# Patient Record
Sex: FEMALE | Race: WHITE | NOT HISPANIC OR LATINO | URBAN - METROPOLITAN AREA
[De-identification: names, ages, dates, MRNs, and addresses within clinical notes are randomized per-mention and may not be internally consistent; named-entity substitution may affect disease eponyms.]

---

## 2019-01-14 ENCOUNTER — EMERGENCY (EMERGENCY)
Facility: HOSPITAL | Age: 79
LOS: 0 days | Discharge: HOME | End: 2019-01-14
Attending: EMERGENCY MEDICINE | Admitting: EMERGENCY MEDICINE

## 2019-01-14 VITALS
HEART RATE: 82 BPM | TEMPERATURE: 98 F | DIASTOLIC BLOOD PRESSURE: 65 MMHG | SYSTOLIC BLOOD PRESSURE: 145 MMHG | RESPIRATION RATE: 20 BRPM | OXYGEN SATURATION: 99 %

## 2019-01-14 DIAGNOSIS — I10 ESSENTIAL (PRIMARY) HYPERTENSION: ICD-10-CM

## 2019-01-14 DIAGNOSIS — M25.50 PAIN IN UNSPECIFIED JOINT: ICD-10-CM

## 2019-01-14 DIAGNOSIS — E78.5 HYPERLIPIDEMIA, UNSPECIFIED: ICD-10-CM

## 2019-01-14 DIAGNOSIS — R53.1 WEAKNESS: ICD-10-CM

## 2019-01-14 DIAGNOSIS — K21.9 GASTRO-ESOPHAGEAL REFLUX DISEASE WITHOUT ESOPHAGITIS: ICD-10-CM

## 2019-01-14 LAB
ANION GAP SERPL CALC-SCNC: 14 MMOL/L — SIGNIFICANT CHANGE UP (ref 7–14)
BASOPHILS # BLD AUTO: 0.04 K/UL — SIGNIFICANT CHANGE UP (ref 0–0.2)
BASOPHILS NFR BLD AUTO: 0.4 % — SIGNIFICANT CHANGE UP (ref 0–1)
BUN SERPL-MCNC: 15 MG/DL — SIGNIFICANT CHANGE UP (ref 10–20)
CALCIUM SERPL-MCNC: 9.4 MG/DL — SIGNIFICANT CHANGE UP (ref 8.5–10.1)
CHLORIDE SERPL-SCNC: 96 MMOL/L — LOW (ref 98–110)
CK SERPL-CCNC: 24 U/L — SIGNIFICANT CHANGE UP (ref 0–225)
CO2 SERPL-SCNC: 27 MMOL/L — SIGNIFICANT CHANGE UP (ref 17–32)
CREAT SERPL-MCNC: 0.7 MG/DL — SIGNIFICANT CHANGE UP (ref 0.7–1.5)
EOSINOPHIL # BLD AUTO: 0.07 K/UL — SIGNIFICANT CHANGE UP (ref 0–0.7)
EOSINOPHIL NFR BLD AUTO: 0.8 % — SIGNIFICANT CHANGE UP (ref 0–8)
GLUCOSE SERPL-MCNC: 98 MG/DL — SIGNIFICANT CHANGE UP (ref 70–99)
HCT VFR BLD CALC: 34.6 % — LOW (ref 37–47)
HGB BLD-MCNC: 10.4 G/DL — LOW (ref 12–16)
IMM GRANULOCYTES NFR BLD AUTO: 0.8 % — HIGH (ref 0.1–0.3)
LYMPHOCYTES # BLD AUTO: 1.86 K/UL — SIGNIFICANT CHANGE UP (ref 1.2–3.4)
LYMPHOCYTES # BLD AUTO: 20.5 % — SIGNIFICANT CHANGE UP (ref 20.5–51.1)
MCHC RBC-ENTMCNC: 17.2 PG — LOW (ref 27–31)
MCHC RBC-ENTMCNC: 30.1 G/DL — LOW (ref 32–37)
MCV RBC AUTO: 57.3 FL — LOW (ref 81–99)
MONOCYTES # BLD AUTO: 0.8 K/UL — HIGH (ref 0.1–0.6)
MONOCYTES NFR BLD AUTO: 8.8 % — SIGNIFICANT CHANGE UP (ref 1.7–9.3)
NEUTROPHILS # BLD AUTO: 6.23 K/UL — SIGNIFICANT CHANGE UP (ref 1.4–6.5)
NEUTROPHILS NFR BLD AUTO: 68.7 % — SIGNIFICANT CHANGE UP (ref 42.2–75.2)
NRBC # BLD: 0 /100 WBCS — SIGNIFICANT CHANGE UP (ref 0–0)
PLATELET # BLD AUTO: 281 K/UL — SIGNIFICANT CHANGE UP (ref 130–400)
POTASSIUM SERPL-MCNC: 4.7 MMOL/L — SIGNIFICANT CHANGE UP (ref 3.5–5)
POTASSIUM SERPL-SCNC: 4.7 MMOL/L — SIGNIFICANT CHANGE UP (ref 3.5–5)
RBC # BLD: 6.04 M/UL — HIGH (ref 4.2–5.4)
RBC # FLD: 20.3 % — HIGH (ref 11.5–14.5)
SODIUM SERPL-SCNC: 137 MMOL/L — SIGNIFICANT CHANGE UP (ref 135–146)
WBC # BLD: 9.07 K/UL — SIGNIFICANT CHANGE UP (ref 4.8–10.8)
WBC # FLD AUTO: 9.07 K/UL — SIGNIFICANT CHANGE UP (ref 4.8–10.8)

## 2019-01-14 NOTE — ED PROVIDER NOTE - CARE PROVIDER_API CALL
Jos Escalera), Gastroenterology; Internal Medicine  [address] [place] [address]  Phone: [phone]  Fax: [phone]    Michela Amezquita), Gastroenterology  [address] [place] [id number]  Phone: [phone]  Fax: [phone]

## 2019-01-14 NOTE — ED PROVIDER NOTE - MEDICAL DECISION MAKING DETAILS
ed work up neg, will f/u w [facility] rheum in 4 days, strict return precautions provided. pt feels well, tylenol/nsaids prn at home until sees rheum

## 2019-01-14 NOTE — ED PROVIDER NOTE - ATTENDING CONTRIBUTION TO CARE
78F PMH htn, gerd, anemia, thalaseemia trait, p/w 1 mo diffuse joint pain and stiffness, worse in mornings. relieved by NSAID. seen by pmd who sent DANAE, crp labs etc and has appt w rheum at [facility] on friday (4 days). has also been worked up for anemia, has appt for EGD/cscope in Feb 2019 with her GI. denies brbpr or melena. no fever, cough. no trauma. no cp, sob, abd pain, nvdc. no dysuria, freq, hematuria. no joint swelling, redness, warmth. no numbness, weakness, tingling. was told by family to come to our ED. asking to colonscopy in ED. asking for steroids.     on exam, AFVSS, well vignesh nad, ncat, eomi, perrla, mmm, lctab, rrr nl s1s2 no mrg, abd soft ntnd, aaox3, no focal deficits, no le edema or calf ttp,     a/p; Joint pains/stiffness x 1 mo, likely autoimmune process, has appt in 4 days w [facility] rheum. Anemia, last Hgb 10.4. will check basic labs, CXR, if neg can f/u with her Rheum and GI appts already scheduled. no signs of sepsis, acs, pna, dissection, cva/tia from H&P.

## 2019-01-14 NOTE — ED PROVIDER NOTE - OBJECTIVE STATEMENT
Pt is a 77y/o female with a pmhx of HTN, HLD presents today for eval of diffuse joint pain, and generalized weakness x 1 month. Pt has appointments to see both a GI specialist (downtrending hemoglobin 12-10) and rheumatologist. Pt denies fever, chills, weakness, n/v/d, CP, SOB., Bloody stool, hematemesis.

## 2019-01-14 NOTE — ED PROVIDER NOTE - NS ED ROS FT
ENMT:  No hearing changes, pain, discharge or infections. No neck pain or stiffness.  Cardiac:  No chest pain, SOB or edema  Respiratory:  No cough or respiratory distress. No hemoptysis. No history of asthma or RAD.  GI:  No nausea, vomiting, diarrhea or abdominal pain.  MS:  + joint pain, No myalgia, muscle weakness,  back pain.  Neuro:  No headache or weakness.  No LOC.  Skin:  No skin rash.   Endocrine: No history of thyroid disease or diabetes.  Except as documented in the HPI,  all other systems are negative.

## 2022-09-06 ENCOUNTER — APPOINTMENT (OUTPATIENT)
Dept: PAIN MANAGEMENT | Facility: CLINIC | Age: 82
End: 2022-09-06

## 2022-09-06 VITALS
BODY MASS INDEX: 28 KG/M2 | SYSTOLIC BLOOD PRESSURE: 140 MMHG | HEIGHT: 63 IN | HEART RATE: 67 BPM | WEIGHT: 158 LBS | DIASTOLIC BLOOD PRESSURE: 86 MMHG

## 2022-09-06 VITALS — BODY MASS INDEX: 28 KG/M2 | WEIGHT: 158 LBS | HEIGHT: 63 IN

## 2022-09-06 DIAGNOSIS — M17.32 UNILATERAL POST-TRAUMATIC OSTEOARTHRITIS, LEFT KNEE: ICD-10-CM

## 2022-09-06 PROBLEM — Z00.00 ENCOUNTER FOR PREVENTIVE HEALTH EXAMINATION: Status: ACTIVE | Noted: 2022-09-06

## 2022-09-06 PROCEDURE — 20610 DRAIN/INJ JOINT/BURSA W/O US: CPT | Mod: LT

## 2022-09-06 PROCEDURE — 99204 OFFICE O/P NEW MOD 45 MIN: CPT | Mod: 25

## 2022-09-06 NOTE — DISCUSSION/SUMMARY
[Medication Risks Reviewed] : Medication risks reviewed [de-identified] : A discussion regarding available pain management treatment options occurred with the patient.  These included interventional, rehabilitative, pharmacological, and alternative modalities. We will proceed with the following:\par \par Interventional treatment options:\par - patient wanted to proceed with a synvisc one plus dexamethasone 4 mg/bupivicaine .25%- 3 ccs due to the worsening left knee pain. Her HEP does not help with the pain and does not tolerate NSAIDs at her age. Her mobility, ADLs, and quality of life is severely impaired due to the left knee pain\par \par Rehabilitative options:\par c/w HEP\par \par Medication based treatment options:\par tylenol prn\par \par Additional treatment recommendations as follows:\par - f/u in 2 weeks\par b/l knee radiographs ordered to assess severity of knee OA\par

## 2022-09-06 NOTE — HISTORY OF PRESENT ILLNESS
[FreeTextEntry1] : 82 year old female presents with a 2 year history of worsening left knee pain that is worse with standing, walking, better with sitting. She had a 3 gel shot series last year by her orthopedic physician in [place] which gave her about 10 months of relief. Her last xray was about 2 years ago which as per patient showed severe OA. The patient denies any knee buckling, falls, numbness, tingling. The pain is dull, aching and does not radiate.

## 2022-09-06 NOTE — PROCEDURE
[Large Joint Injection] : Large joint injection [Left] : of the left [Knee] : knee [Pain] : pain [Inflammation] : inflammation [Alcohol] : alcohol [___ cc    0.25%] : Bupivacaine (Marcaine) ~Vcc of 0.25%  [___ cc    4mg] : Dexamethasone (Decadron) ~Vcc of 4 mg  [Synvisc] : Synvisc [Call if redness, pain or fever occur] : call if redness, pain or fever occur [Apply ice for 15min out of every hour for the next 12-24 hours as tolerated] : apply ice for 15 minutes out of every hour for the next 12-24 hours as tolerated [Previous OTC use and PT nontherapeutic] : patient has tried OTC's including aspirin, Ibuprofen, Aleve, etc or prescription NSAIDS, and/or exercises at home and/or physical therapy without satisfactory response [Risks, benefits, alternatives discussed / Verbal consent obtained] : the risks benefits, and alternatives have been discussed, and verbal consent was obtained

## 2022-10-05 ENCOUNTER — APPOINTMENT (OUTPATIENT)
Dept: PAIN MANAGEMENT | Facility: CLINIC | Age: 82
End: 2022-10-05

## 2022-10-10 ENCOUNTER — APPOINTMENT (OUTPATIENT)
Dept: PAIN MANAGEMENT | Facility: CLINIC | Age: 82
End: 2022-10-10

## 2022-10-10 VITALS
DIASTOLIC BLOOD PRESSURE: 76 MMHG | HEART RATE: 65 BPM | WEIGHT: 158 LBS | BODY MASS INDEX: 28 KG/M2 | HEIGHT: 63 IN | SYSTOLIC BLOOD PRESSURE: 139 MMHG

## 2022-10-10 PROCEDURE — 99214 OFFICE O/P EST MOD 30 MIN: CPT

## 2022-10-10 RX ORDER — MELOXICAM 7.5 MG/1
7.5 TABLET ORAL DAILY
Qty: 10 | Refills: 0 | Status: ACTIVE | COMMUNITY
Start: 2022-10-10 | End: 1900-01-01

## 2022-10-10 RX ORDER — DICLOFENAC SODIUM 1% 10 MG/G
1 GEL TOPICAL DAILY
Qty: 1 | Refills: 0 | Status: ACTIVE | COMMUNITY
Start: 2022-10-10 | End: 1900-01-01

## 2022-10-10 NOTE — HISTORY OF PRESENT ILLNESS
[FreeTextEntry1] : 82 year old female presents with a 2 year history of worsening left knee pain that is worse with standing, walking, better with sitting. She had a 3 gel shot series last year by her orthopedic physician in [place] which gave her about 10 months of relief. Her last xray was about 2 years ago which as per patient showed severe OA. The patient denies any knee buckling, falls, numbness, tingling. The pain is dull, aching and does not radiate.\par \par interval hx 10/10/22\par \par The patient had 80% pain relief after a gel one plus steroid injection on the left knee for 3 weeks. Today she made a cutting manuever and had an exacerbation. She has some good and bad days over the last 3 weeks. The pain is worse with standing and walking and better with sitting. The pain is typically dull in nature and at times sharp. She denies falls, tripping, and weakness. Her xray showed moderate OA for [facility] imaging.

## 2022-10-19 ENCOUNTER — APPOINTMENT (OUTPATIENT)
Dept: PAIN MANAGEMENT | Facility: CLINIC | Age: 82
End: 2022-10-19

## 2022-10-19 PROCEDURE — 64454 NJX AA&/STRD GNCLR NRV BRNCH: CPT | Mod: LT

## 2022-10-19 NOTE — PROCEDURE
[FreeTextEntry3] : Date of Service: 10/19/2022 \par \par Account: [id number]\par \par Patient: SHAQUILLE MCCARTHY \par \par YOB: 1940\par \par Age: 82 year\par \par Surgeon:  Robinson Joseph DO\par \par Assistant: None\par \par Pre-Operative Diagnosis: \par 1) Chronic left knee pain\par 2) Left knee osteoarthritis\par \par Post Operative Diagnosis:\par 1) Chronic left knee pain\par 2) Left knee osteoarthritis\par \par Procedure:\par 1) Left superior medial genicular nerve block with fluoroscopic guidance\par 2) Left superior lateral genicular nerve block with fluoroscopic guidance\par 3) Left inferomedial genicular nerve block with fluoroscopic guidance\par  \par Anesthesia:  none\par \par This procedure was carried out using fluoroscopic guidance.  The risks and benefits of the procedure were discussed extensively with the patient.  The consent of the patient was obtained and the following procedure was performed.  The patient was placed in the supine position on the fluoroscopy table with the left knee flexed to approximately 60 degrees and the area was prepped and draped in a sterile fashion.  A timeout was performed with all essential staff present and the site and side were verified.\par  \par  Then a 3.5 inch 25 gauge spinal needle was advanced under fluoroscopic guidance to medial flare of the femur metaphysis/diaphysis junction on the left side.  The needle position was confirmed in A/P and lateral fluoroscopic views.  Then 1 ml of 0.5% Bupivacaine was injected after negative aspiration at the left superior medial genicular branch.   This was repeated in the exact same fashion at the left superior lateral genicular branch and inferior medial genicular branch.\par  \par The needles were subsequently removed.  Vital signs remained normal.  Pulse oximeter was used throughout the procedure and the patient's pulse and oxygen saturation remained within normal limits.  The patient tolerated the procedure well.  There were no complications.  The patient was instructed to apply ice over the injection sites for twenty minutes every two hours for the next 24 to 48 hours.\par  \par Disposition:\par 1. The patient was advised to F/U in 1-2 weeks to assess the response to the injection.\par 2. The patient was also instructed to contact me immediately if there were any concerns related to the procedure performed.\par \par \par Robinson Joseph, \par

## 2023-06-14 ENCOUNTER — APPOINTMENT (OUTPATIENT)
Dept: PAIN MANAGEMENT | Facility: CLINIC | Age: 83
End: 2023-06-14
Payer: MEDICARE

## 2023-06-14 DIAGNOSIS — M25.571 PAIN IN RIGHT ANKLE AND JOINTS OF RIGHT FOOT: ICD-10-CM

## 2023-06-14 DIAGNOSIS — G89.29 PAIN IN LEFT KNEE: ICD-10-CM

## 2023-06-14 DIAGNOSIS — M17.9 OSTEOARTHRITIS OF KNEE, UNSPECIFIED: ICD-10-CM

## 2023-06-14 DIAGNOSIS — M25.562 PAIN IN LEFT KNEE: ICD-10-CM

## 2023-06-14 PROCEDURE — 64454 NJX AA&/STRD GNCLR NRV BRNCH: CPT | Mod: LT

## 2023-06-14 PROCEDURE — 99214 OFFICE O/P EST MOD 30 MIN: CPT | Mod: 25

## 2023-06-14 RX ORDER — DICLOFENAC SODIUM 1% 10 MG/G
1 GEL TOPICAL DAILY
Qty: 1 | Refills: 0 | Status: ACTIVE | COMMUNITY
Start: 2023-06-14 | End: 1900-01-01

## 2023-06-14 NOTE — PROCEDURE
[FreeTextEntry3] : \par Surgeon:    Robinson Joseph DO\par \par Assistant:     None\par \par Pre-Operative Diagnosis: \par 1) Chronic left knee pain\par 2) left left osteoarthritis\par \par Post Operative Diagnosis:\par 1) Chronic left knee pain\par 2) left knee osteoarthritis\par \par Procedure:\par 1) left superior medial genicular nerve block with fluoroscopic guidance\par 2) left superior lateral genicular nerve block with fluoroscopic guidance\par 3) left inferomedial genicular nerve block with fluoroscopic guidance\par \par Anesthesia:  none\par \par This procedure was carried out using fluoroscopic guidance.  The risks and benefits of the procedure were discussed extensively with the patient.  The consent of the patient was obtained and the following procedure was performed.  The patient was placed in the supine position on the fluoroscopy table with the left knee flexed to approximately 60 degrees and the area was prepped and draped in a sterile fashion.  A timeout was performed with all essential staff present and the site and side were verified.\par \par Then a 3.5 inch 25 gauge spinal needle was advanced under fluoroscopic guidance to medial flare of the femur metaphysis/diaphysis junction on the left side.  The needle position was confirmed in A/P and lateral fluoroscopic views.  Then 1 ml of 0.5% Bupivacaine was injected after negative aspiration at the left superior medial genicular branch.   This was repeated in the exact same fashion at the left superior lateral genicular branch located at the lateral flare of the femur metaphysis/diaphysis junction and inferior medial genicular branch located at the medial flare of tibial metaphysis/diaphysis junction. \par \par The needles were subsequently removed.  Vital signs remained normal.  Pulse oximeter was used throughout the procedure and the patient's pulse and oxygen saturation remained within normal limits.  The patient tolerated the procedure well.  There were no complications.  The patient was instructed to apply ice over the injection sites for twenty minutes every two hours for the next 24 to 48 hours.\par \par Disposition:\par \par 1. The patient was advised to F/U in 1-2 weeks to assess the response to the injection.\par 2. The patient was also instructed to contact me immediately if there were any concerns related to the procedure performed.\par \par  \par Robinson Joseph DO\par \par \par

## 2023-06-16 PROBLEM — M17.9 OA (OSTEOARTHRITIS) OF KNEE: Status: ACTIVE | Noted: 2022-09-06

## 2023-06-16 PROBLEM — M25.562 CHRONIC PAIN OF LEFT KNEE: Status: ACTIVE | Noted: 2023-06-16

## 2023-06-16 NOTE — PHYSICAL EXAM
[de-identified] : left knee\par medial and lateral joint line tenderness\par mild effusion\par antalgic gait\par \par left ankle\par TTP to talonavicular joint\par NT to medial and later ankle ligaments, retrocalcaneal bursa, medial heel

## 2023-06-16 NOTE — DISCUSSION/SUMMARY
[Medication Risks Reviewed] : Medication risks reviewed [de-identified] : A discussion regarding available pain management treatment options occurred with the patient.  These included interventional, rehabilitative, pharmacological, and alternative modalities. We will proceed with the following:\par \par Interventional treatment options:\par 2nd left knee genicular block decided to do today\par \par Medication based treatment options:\par tylenol prn, sent mobic for 10 days, voltaren to apply to her left foot\par \par Additional treatment recommendations as follows:\par - f/u in 2 weeks\par \par ordered left ankle xray\par

## 2023-06-16 NOTE — HISTORY OF PRESENT ILLNESS
[FreeTextEntry1] : 82 year old female presents with a 2 year history of worsening left knee pain that is worse with standing, walking, better with sitting. She had a 3 gel shot series last year by her orthopedic physician in [place] which gave her about 10 months of relief. Her last xray was about 2 years ago which as per patient showed severe OA. The patient denies any knee buckling, falls, numbness, tingling. The pain is dull, aching and does not radiate.\par \par interval hx 10/10/22\par \par The patient had 80% pain relief after a gel one plus steroid injection on the left knee for 3 weeks. Today she made a cutting manuever and had an exacerbation. She has some good and bad days over the last 3 weeks. The pain is worse with standing and walking and better with sitting. The pain is typically dull in nature and at times sharp. She denies falls, tripping, and weakness. Her xray showed moderate OA for [facility] imaging. \par \par interval hx 6/14/2023\par Patient had 7 months of pain relief to left knee after a left sided genicular nerve block. She now has left sharp, dull achy knee pain worse with standing and walking, better with sitting. Pain is a 9/10 and interferes with ADLs and QOL. Pt denies bowel/bladder incontinence, weakness, falls, unsteadiness. She also has dull, achy dorsum left foot pain worse with standing, walking, better with sitting that is a 9/10. She denies tingling but admits to burning, numbness without low back pain. She uses voltaren gel without relief. \par

## 2024-04-16 RX ORDER — MELOXICAM 15 MG/1
15 TABLET ORAL
Qty: 30 | Refills: 0 | Status: ACTIVE | COMMUNITY
Start: 2023-06-14 | End: 1900-01-01

## 2024-05-06 RX ORDER — MELOXICAM 7.5 MG/1
7.5 TABLET ORAL
Qty: 30 | Refills: 0 | Status: ACTIVE | COMMUNITY
Start: 2024-05-06 | End: 1900-01-01

## 2024-05-08 ENCOUNTER — APPOINTMENT (OUTPATIENT)
Dept: PAIN MANAGEMENT | Facility: CLINIC | Age: 84
End: 2024-05-08
Payer: MEDICARE

## 2024-05-08 DIAGNOSIS — M25.572 PAIN IN LEFT ANKLE AND JOINTS OF LEFT FOOT: ICD-10-CM

## 2024-05-08 DIAGNOSIS — G89.29 PAIN IN LEFT ANKLE AND JOINTS OF LEFT FOOT: ICD-10-CM

## 2024-05-08 PROCEDURE — J3490M: CUSTOM | Mod: NC

## 2024-05-08 PROCEDURE — 20606 DRAIN/INJ JOINT/BURSA W/US: CPT | Mod: LT

## 2024-05-08 NOTE — PROCEDURE
[Talonavicular Joint] : talonavicular joint [___ cc    0.25%] : Bupivacaine (Marcaine) ~Vcc of 0.25%  [Call if redness, pain or fever occur] : call if redness, pain or fever occur [Apply ice for 15min out of every hour for the next 12-24 hours as tolerated] : apply ice for 15 minutes out of every hour for the next 12-24 hours as tolerated [Previous OTC use and PT nontherapeutic] : patient has tried OTC's including aspirin, Ibuprofen, Aleve, etc or prescription NSAIDS, and/or exercises at home and/or physical therapy without satisfactory response [Patient had decreased mobility in the joint] : patient had decreased mobility in the joint [Risks, benefits, alternatives discussed / Verbal consent obtained] : the risks benefits, and alternatives have been discussed, and verbal consent was obtained [Left] : of the left [Alcohol] : alcohol

## 2024-08-15 ENCOUNTER — EMERGENCY (EMERGENCY)
Facility: HOSPITAL | Age: 84
LOS: 0 days | Discharge: HOME | End: 2024-08-16
Attending: EMERGENCY MEDICINE
Payer: MEDICARE

## 2024-08-15 VITALS
SYSTOLIC BLOOD PRESSURE: 180 MMHG | HEART RATE: 77 BPM | WEIGHT: 160.06 LBS | RESPIRATION RATE: 18 BRPM | DIASTOLIC BLOOD PRESSURE: 89 MMHG | TEMPERATURE: 98 F | OXYGEN SATURATION: 96 %

## 2024-08-15 DIAGNOSIS — R42 DIZZINESS AND GIDDINESS: ICD-10-CM

## 2024-08-15 DIAGNOSIS — R50.9 FEVER, UNSPECIFIED: ICD-10-CM

## 2024-08-15 DIAGNOSIS — I10 ESSENTIAL (PRIMARY) HYPERTENSION: ICD-10-CM

## 2024-08-15 DIAGNOSIS — E78.5 HYPERLIPIDEMIA, UNSPECIFIED: ICD-10-CM

## 2024-08-15 DIAGNOSIS — R53.1 WEAKNESS: ICD-10-CM

## 2024-08-15 DIAGNOSIS — R09.81 NASAL CONGESTION: ICD-10-CM

## 2024-08-15 LAB
ALBUMIN SERPL ELPH-MCNC: 4.2 G/DL — SIGNIFICANT CHANGE UP (ref 3.5–5.2)
ALP SERPL-CCNC: 83 U/L — SIGNIFICANT CHANGE UP (ref 30–115)
ALT FLD-CCNC: 10 U/L — SIGNIFICANT CHANGE UP (ref 0–41)
ANION GAP SERPL CALC-SCNC: 12 MMOL/L — SIGNIFICANT CHANGE UP (ref 7–14)
ANISOCYTOSIS BLD QL: SIGNIFICANT CHANGE UP
AST SERPL-CCNC: 16 U/L — SIGNIFICANT CHANGE UP (ref 0–41)
BASOPHILS # BLD AUTO: 0.06 K/UL — SIGNIFICANT CHANGE UP (ref 0–0.2)
BASOPHILS NFR BLD AUTO: 0.9 % — SIGNIFICANT CHANGE UP (ref 0–1)
BILIRUB SERPL-MCNC: 0.8 MG/DL — SIGNIFICANT CHANGE UP (ref 0.2–1.2)
BUN SERPL-MCNC: 18 MG/DL — SIGNIFICANT CHANGE UP (ref 10–20)
CALCIUM SERPL-MCNC: 8.7 MG/DL — SIGNIFICANT CHANGE UP (ref 8.4–10.5)
CHLORIDE SERPL-SCNC: 102 MMOL/L — SIGNIFICANT CHANGE UP (ref 98–110)
CO2 SERPL-SCNC: 25 MMOL/L — SIGNIFICANT CHANGE UP (ref 17–32)
CREAT SERPL-MCNC: 0.9 MG/DL — SIGNIFICANT CHANGE UP (ref 0.7–1.5)
DACRYOCYTES BLD QL SMEAR: SLIGHT — SIGNIFICANT CHANGE UP
EGFR: 63 ML/MIN/1.73M2 — SIGNIFICANT CHANGE UP
ELLIPTOCYTES BLD QL SMEAR: SLIGHT — SIGNIFICANT CHANGE UP
EOSINOPHIL # BLD AUTO: 0.12 K/UL — SIGNIFICANT CHANGE UP (ref 0–0.7)
EOSINOPHIL NFR BLD AUTO: 1.8 % — SIGNIFICANT CHANGE UP (ref 0–8)
GLUCOSE SERPL-MCNC: 108 MG/DL — HIGH (ref 70–99)
HCT VFR BLD CALC: 32.9 % — LOW (ref 37–47)
HGB BLD-MCNC: 9.9 G/DL — LOW (ref 12–16)
HYPOCHROMIA BLD QL: SLIGHT — SIGNIFICANT CHANGE UP
LYMPHOCYTES # BLD AUTO: 2.43 K/UL — SIGNIFICANT CHANGE UP (ref 1.2–3.4)
LYMPHOCYTES # BLD AUTO: 36 % — SIGNIFICANT CHANGE UP (ref 20.5–51.1)
MAGNESIUM SERPL-MCNC: 1.9 MG/DL — SIGNIFICANT CHANGE UP (ref 1.8–2.4)
MANUAL SMEAR VERIFICATION: SIGNIFICANT CHANGE UP
MCHC RBC-ENTMCNC: 18.6 PG — LOW (ref 27–31)
MCHC RBC-ENTMCNC: 30.1 G/DL — LOW (ref 32–37)
MCV RBC AUTO: 61.7 FL — LOW (ref 81–99)
MICROCYTES BLD QL: SIGNIFICANT CHANGE UP
MONOCYTES # BLD AUTO: 0.06 K/UL — LOW (ref 0.1–0.6)
MONOCYTES NFR BLD AUTO: 0.9 % — LOW (ref 1.7–9.3)
NEUTROPHILS # BLD AUTO: 3.72 K/UL — SIGNIFICANT CHANGE UP (ref 1.4–6.5)
NEUTROPHILS NFR BLD AUTO: 54.1 % — SIGNIFICANT CHANGE UP (ref 42.2–75.2)
NEUTS BAND # BLD: 0.9 % — SIGNIFICANT CHANGE UP (ref 0–6)
OVALOCYTES BLD QL SMEAR: SLIGHT — SIGNIFICANT CHANGE UP
PLAT MORPH BLD: NORMAL — SIGNIFICANT CHANGE UP
PLATELET # BLD AUTO: 175 K/UL — SIGNIFICANT CHANGE UP (ref 130–400)
PMV BLD: SIGNIFICANT CHANGE UP (ref 7.4–10.4)
POIKILOCYTOSIS BLD QL AUTO: SIGNIFICANT CHANGE UP
POLYCHROMASIA BLD QL SMEAR: SLIGHT — SIGNIFICANT CHANGE UP
POTASSIUM SERPL-MCNC: 4.2 MMOL/L — SIGNIFICANT CHANGE UP (ref 3.5–5)
POTASSIUM SERPL-SCNC: 4.2 MMOL/L — SIGNIFICANT CHANGE UP (ref 3.5–5)
PROT SERPL-MCNC: 6.2 G/DL — SIGNIFICANT CHANGE UP (ref 6–8)
RBC # BLD: 5.33 M/UL — SIGNIFICANT CHANGE UP (ref 4.2–5.4)
RBC # FLD: 17.2 % — HIGH (ref 11.5–14.5)
RBC BLD AUTO: ABNORMAL
SCHISTOCYTES BLD QL AUTO: SLIGHT — SIGNIFICANT CHANGE UP
SMUDGE CELLS # BLD: PRESENT — SIGNIFICANT CHANGE UP
SODIUM SERPL-SCNC: 139 MMOL/L — SIGNIFICANT CHANGE UP (ref 135–146)
TROPONIN T, HIGH SENSITIVITY RESULT: 9 NG/L — SIGNIFICANT CHANGE UP (ref 6–13)
VARIANT LYMPHS # BLD: 5.4 % — HIGH (ref 0–5)
WBC # BLD: 6.76 K/UL — SIGNIFICANT CHANGE UP (ref 4.8–10.8)
WBC # FLD AUTO: 6.76 K/UL — SIGNIFICANT CHANGE UP (ref 4.8–10.8)

## 2024-08-15 PROCEDURE — 87086 URINE CULTURE/COLONY COUNT: CPT

## 2024-08-15 PROCEDURE — 85025 COMPLETE CBC W/AUTO DIFF WBC: CPT

## 2024-08-15 PROCEDURE — 93010 ELECTROCARDIOGRAM REPORT: CPT

## 2024-08-15 PROCEDURE — 36415 COLL VENOUS BLD VENIPUNCTURE: CPT

## 2024-08-15 PROCEDURE — 71045 X-RAY EXAM CHEST 1 VIEW: CPT

## 2024-08-15 PROCEDURE — 71045 X-RAY EXAM CHEST 1 VIEW: CPT | Mod: 26

## 2024-08-15 PROCEDURE — 83735 ASSAY OF MAGNESIUM: CPT

## 2024-08-15 PROCEDURE — 84484 ASSAY OF TROPONIN QUANT: CPT

## 2024-08-15 PROCEDURE — 93005 ELECTROCARDIOGRAM TRACING: CPT

## 2024-08-15 PROCEDURE — 81001 URINALYSIS AUTO W/SCOPE: CPT

## 2024-08-15 PROCEDURE — 99285 EMERGENCY DEPT VISIT HI MDM: CPT | Mod: FS

## 2024-08-15 PROCEDURE — 99285 EMERGENCY DEPT VISIT HI MDM: CPT | Mod: 25

## 2024-08-15 PROCEDURE — 80053 COMPREHEN METABOLIC PANEL: CPT

## 2024-08-15 NOTE — ED ADULT NURSE NOTE - NSFALLHARMRISKINTERV_ED_ALL_ED

## 2024-08-15 NOTE — ED PROVIDER NOTE - PROGRESS NOTE DETAILS
ck: results discussed with patient and family at bedside. patient asymptomatic currently. shared decision making with family and team at bedside, plan to increase enalapril 10mg bid to 20mg bid x1 week. plan for urgent outpatient fu with pmd. patient and family educated on signs and symptoms to be aware of that should prompt return to the er. patient and family verbalize understanding and are agreeable with plan.

## 2024-08-15 NOTE — ED ADULT NURSE NOTE - CAS DISCH CONDITION
Discharge, follow up and medication instructions reviewed with patient who verbalizes understanding. No needs or concerns reported at time of discharge. Patient ambulated from department with steady gait.    Stable

## 2024-08-15 NOTE — ED PROVIDER NOTE - CLINICAL SUMMARY MEDICAL DECISION MAKING FREE TEXT BOX
Patient presents today for evaluation of elevated BP.  Also had URI symptoms.  Required labs, imaging.  Was found to have mild anemia troponin is stable.  Chest x-ray without acute findings.  Patient stable for discharge.  Discussed increase in blood pressure medication and following up with PMD.

## 2024-08-15 NOTE — ED ADULT NURSE NOTE - OBJECTIVE STATEMENT
Aox3 pt c/o weakness Aox3 pt c/o weakness, states she felt dizzy earlier in day. Denies chest pain or SOB during initial assessment . IV placed, labs sent per provider order

## 2024-08-15 NOTE — ED PROVIDER NOTE - OBJECTIVE STATEMENT
84 year old female, past medical history htn, hdl, who presents with weakness. patient with uri symptoms and generalized weakness that began x5 days ago. patient started/completed zpak without improvement, followed by being started on augmentin yesterday. patient with lightheadedness today prompting patient to check BP noted to be SBP 190s. patient prescribed enalapril 10mg BID, due to elevated BP tonight despite compliance with prescribed meds, took additional 5mg enalapril. denies fever, headache, vision changes, chest pain, shortness of breath, nausea/vomiting, numbness/weakness, syncope.

## 2024-08-15 NOTE — ED PROVIDER NOTE - PATIENT PORTAL LINK FT
You can access the FollowMyHealth Patient Portal offered by [facility] by registering at the following website: [email or web address]. By joining [facility]’s FollowMyHealth portal, you will also be able to view your health information using other applications (apps) compatible with our system.

## 2024-08-15 NOTE — ED PROVIDER NOTE - PHYSICAL EXAMINATION
CONSTITUTIONAL: non-toxic appearing female, nad  SKIN: skin exam is warm and dry  HEAD: Normocephalic; atraumatic  EYES: PERRL, EOM intact; conjunctiva and sclera clear.  ENT: MMM, +nasal congestion   NECK: ROM intact.  CARD: S1, S2 normal, no murmur  RESP: No wheezes, rales or rhonchi. Good air movement bilaterally  ABD: soft; non-distended; non-tender.   EXT: Normal ROM   NEURO: awake, alert, following commands, oriented, grossly unremarkable. No Focal deficits. GCS 15. normal finger to nose. CN 2-12 intact.  strength intact b/l ue/le.   PSYCH: Cooperative, appropriate.

## 2024-08-16 VITALS
OXYGEN SATURATION: 98 % | SYSTOLIC BLOOD PRESSURE: 199 MMHG | DIASTOLIC BLOOD PRESSURE: 84 MMHG | RESPIRATION RATE: 18 BRPM | HEART RATE: 85 BPM | TEMPERATURE: 98 F

## 2024-08-16 LAB
APPEARANCE UR: CLEAR — SIGNIFICANT CHANGE UP
BACTERIA # UR AUTO: NEGATIVE /HPF — SIGNIFICANT CHANGE UP
BILIRUB UR-MCNC: NEGATIVE — SIGNIFICANT CHANGE UP
CAST: 0 /LPF — SIGNIFICANT CHANGE UP (ref 0–4)
COLOR SPEC: YELLOW — SIGNIFICANT CHANGE UP
DIFF PNL FLD: NEGATIVE — SIGNIFICANT CHANGE UP
GLUCOSE UR QL: NEGATIVE MG/DL — SIGNIFICANT CHANGE UP
KETONES UR-MCNC: NEGATIVE MG/DL — SIGNIFICANT CHANGE UP
LEUKOCYTE ESTERASE UR-ACNC: ABNORMAL
NITRITE UR-MCNC: NEGATIVE — SIGNIFICANT CHANGE UP
PH UR: 6.5 — SIGNIFICANT CHANGE UP (ref 5–8)
PROT UR-MCNC: NEGATIVE MG/DL — SIGNIFICANT CHANGE UP
RBC CASTS # UR COMP ASSIST: 0 /HPF — SIGNIFICANT CHANGE UP (ref 0–4)
SP GR SPEC: 1.01 — SIGNIFICANT CHANGE UP (ref 1–1.03)
SQUAMOUS # UR AUTO: 0 /HPF — SIGNIFICANT CHANGE UP (ref 0–5)
TROPONIN T, HIGH SENSITIVITY RESULT: 10 NG/L — SIGNIFICANT CHANGE UP (ref 6–13)
UROBILINOGEN FLD QL: 0.2 MG/DL — SIGNIFICANT CHANGE UP (ref 0.2–1)
WBC UR QL: 3 /HPF — SIGNIFICANT CHANGE UP (ref 0–5)

## 2024-08-16 RX ORDER — ENALAPRIL MALEATE 10 MG/1
10 TABLET ORAL ONCE
Refills: 0 | Status: COMPLETED | OUTPATIENT
Start: 2024-08-16 | End: 2024-08-16

## 2024-08-16 RX ADMIN — ENALAPRIL MALEATE 10 MILLIGRAM(S): 10 TABLET ORAL at 01:06

## 2024-08-17 LAB
CULTURE RESULTS: NO GROWTH — SIGNIFICANT CHANGE UP
SPECIMEN SOURCE: SIGNIFICANT CHANGE UP

## 2024-12-12 ENCOUNTER — APPOINTMENT (OUTPATIENT)
Dept: PAIN MANAGEMENT | Facility: CLINIC | Age: 84
End: 2024-12-12
Payer: MEDICARE

## 2024-12-12 DIAGNOSIS — G89.29 PAIN IN LEFT ANKLE AND JOINTS OF LEFT FOOT: ICD-10-CM

## 2024-12-12 DIAGNOSIS — M54.50 LOW BACK PAIN, UNSPECIFIED: ICD-10-CM

## 2024-12-12 DIAGNOSIS — M25.572 PAIN IN LEFT ANKLE AND JOINTS OF LEFT FOOT: ICD-10-CM

## 2024-12-12 PROCEDURE — 99214 OFFICE O/P EST MOD 30 MIN: CPT | Mod: 25

## 2024-12-12 PROCEDURE — 20605 DRAIN/INJ JOINT/BURSA W/O US: CPT | Mod: LT

## 2024-12-12 RX ORDER — METHYLPREDNISOLONE 4 MG/1
4 TABLET ORAL
Qty: 1 | Refills: 0 | Status: ACTIVE | COMMUNITY
Start: 2024-12-12 | End: 1900-01-01

## 2024-12-12 RX ORDER — MELOXICAM 7.5 MG/1
7.5 TABLET ORAL
Qty: 30 | Refills: 0 | Status: ACTIVE | COMMUNITY
Start: 2024-12-12 | End: 1900-01-01

## 2024-12-30 ENCOUNTER — APPOINTMENT (OUTPATIENT)
Dept: MRI IMAGING | Facility: CLINIC | Age: 84
End: 2024-12-30
Payer: MEDICARE

## 2024-12-30 DIAGNOSIS — Z00.00 ENCOUNTER FOR GENERAL ADULT MEDICAL EXAMINATION W/OUT ABNORMAL FINDINGS: ICD-10-CM

## 2024-12-30 PROCEDURE — 72148 MRI LUMBAR SPINE W/O DYE: CPT | Mod: MH

## 2025-01-02 ENCOUNTER — APPOINTMENT (OUTPATIENT)
Dept: PAIN MANAGEMENT | Facility: CLINIC | Age: 85
End: 2025-01-02
Payer: MEDICARE

## 2025-01-02 DIAGNOSIS — M54.16 RADICULOPATHY, LUMBAR REGION: ICD-10-CM

## 2025-01-02 PROCEDURE — 62323 NJX INTERLAMINAR LMBR/SAC: CPT

## 2025-01-02 RX ORDER — GABAPENTIN 100 MG/1
100 CAPSULE ORAL 3 TIMES DAILY
Qty: 90 | Refills: 0 | Status: ACTIVE | COMMUNITY
Start: 2025-01-02 | End: 1900-01-01

## 2025-01-02 RX ORDER — ACETAMINOPHEN AND CODEINE PHOSPHATE 300; 30 MG/1; MG/1
300-30 TABLET ORAL
Qty: 28 | Refills: 0 | Status: ACTIVE | COMMUNITY
Start: 2025-01-02 | End: 1900-01-01

## 2025-01-17 ENCOUNTER — APPOINTMENT (OUTPATIENT)
Dept: PAIN MANAGEMENT | Facility: CLINIC | Age: 85
End: 2025-01-17

## 2025-09-05 ENCOUNTER — APPOINTMENT (OUTPATIENT)
Dept: PAIN MANAGEMENT | Facility: CLINIC | Age: 85
End: 2025-09-05
Payer: MEDICARE

## 2025-09-05 DIAGNOSIS — M54.16 RADICULOPATHY, LUMBAR REGION: ICD-10-CM

## 2025-09-05 DIAGNOSIS — M46.1 SACROILIITIS, NOT ELSEWHERE CLASSIFIED: ICD-10-CM

## 2025-09-05 DIAGNOSIS — M79.18 MYALGIA, OTHER SITE: ICD-10-CM

## 2025-09-05 PROCEDURE — 20552 NJX 1/MLT TRIGGER POINT 1/2: CPT | Mod: RT

## 2025-09-05 PROCEDURE — 99214 OFFICE O/P EST MOD 30 MIN: CPT | Mod: 25

## 2025-09-05 RX ORDER — LIDOCAINE 5% 700 MG/1
5 PATCH TOPICAL
Qty: 60 | Refills: 2 | Status: ACTIVE | COMMUNITY
Start: 2025-09-05 | End: 1900-01-01

## 2025-09-05 RX ORDER — MELOXICAM 15 MG/1
15 TABLET ORAL DAILY
Qty: 30 | Refills: 1 | Status: DISCONTINUED | COMMUNITY
Start: 2025-09-05 | End: 2025-09-05

## 2025-09-05 RX ORDER — MELOXICAM 7.5 MG/1
7.5 TABLET ORAL
Qty: 30 | Refills: 0 | Status: ACTIVE | COMMUNITY
Start: 2025-09-05 | End: 1900-01-01

## 2025-09-06 PROBLEM — M46.1 INFLAMMATION OF RIGHT SACROILIAC JOINT: Status: ACTIVE | Noted: 2025-09-06

## 2025-09-06 PROBLEM — M79.18 MYOFASCIAL PAIN SYNDROME OF LUMBAR SPINE: Status: ACTIVE | Noted: 2025-09-06

## 2025-09-17 ENCOUNTER — APPOINTMENT (OUTPATIENT)
Dept: PAIN MANAGEMENT | Facility: CLINIC | Age: 85
End: 2025-09-17
Payer: MEDICARE

## 2025-09-17 DIAGNOSIS — M54.16 RADICULOPATHY, LUMBAR REGION: ICD-10-CM

## 2025-09-17 PROCEDURE — 62323 NJX INTERLAMINAR LMBR/SAC: CPT
